# Patient Record
Sex: MALE | Race: WHITE | NOT HISPANIC OR LATINO | Employment: OTHER | URBAN - METROPOLITAN AREA
[De-identification: names, ages, dates, MRNs, and addresses within clinical notes are randomized per-mention and may not be internally consistent; named-entity substitution may affect disease eponyms.]

---

## 2023-08-11 PROBLEM — Z86.79 HISTORY OF INTRACRANIAL ANEURYSM: Status: ACTIVE | Noted: 2023-08-11

## 2023-08-11 PROBLEM — Z98.890 S/P ANEURYSM REPAIR: Status: ACTIVE | Noted: 2023-08-11

## 2023-08-11 PROBLEM — R41.9 DEFICIT IN COMPREHENSION: Status: ACTIVE | Noted: 2023-08-11

## 2023-08-11 PROBLEM — R05.9 COUGH: Status: ACTIVE | Noted: 2023-08-11

## 2023-08-11 PROBLEM — F32.A DEPRESSION: Status: ACTIVE | Noted: 2023-08-11

## 2023-08-11 PROBLEM — G40.909 SEIZURE DISORDER (MULTI): Status: ACTIVE | Noted: 2023-08-11

## 2023-08-11 PROBLEM — I10 HTN (HYPERTENSION), BENIGN: Status: ACTIVE | Noted: 2023-08-11

## 2023-08-11 PROBLEM — G81.91 HEMIPARESIS, RIGHT (MULTI): Status: ACTIVE | Noted: 2023-08-11

## 2023-08-11 PROBLEM — M25.561 RIGHT KNEE PAIN: Status: ACTIVE | Noted: 2023-08-11

## 2023-08-11 PROBLEM — K21.9 GERD (GASTROESOPHAGEAL REFLUX DISEASE): Status: ACTIVE | Noted: 2023-08-11

## 2023-08-11 PROBLEM — I69.020: Status: ACTIVE | Noted: 2023-08-11

## 2023-08-11 PROBLEM — R47.89 SPEECH DYSFLUENCY: Status: ACTIVE | Noted: 2023-08-11

## 2023-08-11 PROBLEM — R10.9 RIGHT FLANK PAIN: Status: ACTIVE | Noted: 2023-08-11

## 2023-08-11 PROBLEM — R31.9 HEMATURIA: Status: ACTIVE | Noted: 2023-08-11

## 2023-08-11 PROBLEM — E66.9 OBESITY (BMI 30-39.9): Status: ACTIVE | Noted: 2023-08-11

## 2023-08-11 PROBLEM — H53.461 HEMIANOPIA, HOMONYMOUS, RIGHT: Status: ACTIVE | Noted: 2023-08-11

## 2023-08-11 PROBLEM — R48.2 APRAXIA: Status: ACTIVE | Noted: 2023-08-11

## 2023-08-11 PROBLEM — Z86.73 HISTORY OF STROKE: Status: ACTIVE | Noted: 2023-08-11

## 2023-08-11 PROBLEM — Z86.79 S/P ANEURYSM REPAIR: Status: ACTIVE | Noted: 2023-08-11

## 2023-08-11 PROBLEM — G40.802: Status: ACTIVE | Noted: 2023-08-11

## 2023-08-11 PROBLEM — N20.1 CALCULUS OF DISTAL RIGHT URETER: Status: ACTIVE | Noted: 2023-08-11

## 2023-08-11 PROBLEM — H52.13 MYOPIA OF BOTH EYES: Status: ACTIVE | Noted: 2023-08-11

## 2023-08-11 PROBLEM — E78.5 HYPERLIPIDEMIA: Status: ACTIVE | Noted: 2023-08-11

## 2023-08-11 RX ORDER — TOPIRAMATE 50 MG/1
1 TABLET, FILM COATED ORAL DAILY
COMMUNITY
End: 2023-10-10 | Stop reason: SDUPTHER

## 2023-08-11 RX ORDER — ACETAMINOPHEN 325 MG/1
TABLET ORAL
COMMUNITY

## 2023-08-11 RX ORDER — LEVETIRACETAM 750 MG/1
1 TABLET ORAL 2 TIMES DAILY
COMMUNITY
End: 2023-08-28

## 2023-08-11 RX ORDER — METOPROLOL TARTRATE 25 MG/1
1 TABLET, FILM COATED ORAL 2 TIMES DAILY
COMMUNITY
Start: 2020-09-18 | End: 2023-08-28

## 2023-08-24 ENCOUNTER — OFFICE VISIT (OUTPATIENT)
Dept: PRIMARY CARE | Facility: CLINIC | Age: 48
End: 2023-08-24

## 2023-08-24 VITALS
RESPIRATION RATE: 18 BRPM | SYSTOLIC BLOOD PRESSURE: 124 MMHG | HEART RATE: 64 BPM | WEIGHT: 220 LBS | DIASTOLIC BLOOD PRESSURE: 76 MMHG | OXYGEN SATURATION: 97 % | HEIGHT: 64 IN | BODY MASS INDEX: 37.56 KG/M2 | TEMPERATURE: 96.8 F

## 2023-08-24 DIAGNOSIS — I69.020 APHASIA DUE TO AND NOT CONCURRENT WITH NONTRAUMATIC SUBARACHNOID HEMORRHAGE: ICD-10-CM

## 2023-08-24 DIAGNOSIS — Z86.79 HISTORY OF INTRACRANIAL ANEURYSM: ICD-10-CM

## 2023-08-24 DIAGNOSIS — Z86.79 S/P ANEURYSM REPAIR: ICD-10-CM

## 2023-08-24 DIAGNOSIS — I10 HTN (HYPERTENSION), BENIGN: ICD-10-CM

## 2023-08-24 DIAGNOSIS — Z00.00 MEDICARE ANNUAL WELLNESS VISIT, SUBSEQUENT: ICD-10-CM

## 2023-08-24 DIAGNOSIS — G81.91 HEMIPARESIS, RIGHT (MULTI): ICD-10-CM

## 2023-08-24 DIAGNOSIS — E78.5 HYPERLIPIDEMIA, UNSPECIFIED HYPERLIPIDEMIA TYPE: ICD-10-CM

## 2023-08-24 DIAGNOSIS — Z98.890 S/P ANEURYSM REPAIR: ICD-10-CM

## 2023-08-24 DIAGNOSIS — G40.909 SEIZURE DISORDER (MULTI): ICD-10-CM

## 2023-08-24 DIAGNOSIS — E66.01 CLASS 2 SEVERE OBESITY WITH SERIOUS COMORBIDITY AND BODY MASS INDEX (BMI) OF 37.0 TO 37.9 IN ADULT, UNSPECIFIED OBESITY TYPE (MULTI): ICD-10-CM

## 2023-08-24 DIAGNOSIS — Z86.73 HISTORY OF STROKE: ICD-10-CM

## 2023-08-24 DIAGNOSIS — Z00.00 ANNUAL PHYSICAL EXAM: Primary | ICD-10-CM

## 2023-08-24 PROCEDURE — 3078F DIAST BP <80 MM HG: CPT | Performed by: INTERNAL MEDICINE

## 2023-08-24 PROCEDURE — 90715 TDAP VACCINE 7 YRS/> IM: CPT | Performed by: INTERNAL MEDICINE

## 2023-08-24 PROCEDURE — 99214 OFFICE O/P EST MOD 30 MIN: CPT | Performed by: INTERNAL MEDICINE

## 2023-08-24 PROCEDURE — 1036F TOBACCO NON-USER: CPT | Performed by: INTERNAL MEDICINE

## 2023-08-24 PROCEDURE — 99396 PREV VISIT EST AGE 40-64: CPT | Performed by: INTERNAL MEDICINE

## 2023-08-24 PROCEDURE — 3008F BODY MASS INDEX DOCD: CPT | Performed by: INTERNAL MEDICINE

## 2023-08-24 PROCEDURE — 3074F SYST BP LT 130 MM HG: CPT | Performed by: INTERNAL MEDICINE

## 2023-08-24 PROCEDURE — 99497 ADVNCD CARE PLAN 30 MIN: CPT | Performed by: INTERNAL MEDICINE

## 2023-08-24 PROCEDURE — 90471 IMMUNIZATION ADMIN: CPT | Performed by: INTERNAL MEDICINE

## 2023-08-24 ASSESSMENT — PAIN SCALES - GENERAL: PAINLEVEL: 0-NO PAIN

## 2023-08-24 ASSESSMENT — PATIENT HEALTH QUESTIONNAIRE - PHQ9
2. FEELING DOWN, DEPRESSED OR HOPELESS: NOT AT ALL
SUM OF ALL RESPONSES TO PHQ9 QUESTIONS 1 AND 2: 0
1. LITTLE INTEREST OR PLEASURE IN DOING THINGS: NOT AT ALL

## 2023-08-24 ASSESSMENT — ACTIVITIES OF DAILY LIVING (ADL)
DRESSING: INDEPENDENT
MANAGING_FINANCES: TOTAL CARE
GROCERY_SHOPPING: NEEDS ASSISTANCE
BATHING: INDEPENDENT
DOING_HOUSEWORK: INDEPENDENT
TAKING_MEDICATION: INDEPENDENT

## 2023-08-24 NOTE — PATIENT INSTRUCTIONS
Plan  TDAP today   Medicare wellness done  Blood tests ordered  Current medications are effective. advised to continue current medications.  Discussed about obesity and complications related to it. Discussed about weight reduction and regular exercise to decrease weight. Questions related to it answered to patient's satisfaction.   F/U 6 months and in 12 months for medicare wellness

## 2023-08-24 NOTE — PROGRESS NOTES
"My nurse note reviewed. Patient is here for:  Medicare Annual Wellness Visit Subsequent     And for follow up  Here with mother in law. As per her he is on medicare currently   Lives alone   Had stroke with aneurysm repair in 2017. Has 2 children, lives with their grandmother   Patient denies any shortness of breath, PND, orthopnea, chest pain , palpitation, syncope or edema in legs  patient denies any abdominal pain, tenderness, nausea, vomiting, change in bowel habits or blood in stool.  Has some trouble focusing eyes at times.   Sees neurologist for his stroke    Reformed smoker    During Medicare wellness apart from looking at assessment done by nurse,  I also asked following :    Alcohol use : Alcohol screening  was  done during this visit. Patient is not having any issue with increase alcohol use . No ETOH abuse was observed by history . Drinks socially . Drinks more pops.    HIV test: patient was not found to be high risk for HIV     Cognitive issue : yes     Advanced Directive: Patient has advanced directive including living will and Health care power of . Health GIORGIO is sister Paulette Stevens . All questions related to it answered. Total time > 16 min.     I have reviewed all active medications patient is currently on . Questions related to medication answered to patient's satisfaction.    Takes meds ok   No seizures over last few yrs   Uses cane for long walking   REVIEW OF SYSTEMS:   All other systems have been reviewed and are negative in relation to patient's complaint and other than what is mentioned in History of present illness.  OBJECTIVE :  /76 (BP Location: Left arm, Patient Position: Sitting, BP Cuff Size: Adult)   Pulse 64   Temp 36 °C (96.8 °F)   Resp 18   Ht 1.626 m (5' 4\")   Wt 99.8 kg (220 lb)   SpO2 97%   BMI 37.76 kg/m²     Vitals noted   Not in acute distress  Conj Pink, No icterus  Neck:No Cervical LN enlargement, No Thyroid enlargement No carotid bruit  Lungs: good air " entry bilaterally, no rales or rhonchi  CVS: S1 S2 + , no S3. No loud heart murmur heard.   Abdomen: Soft, non tender , BS +. no organomegaly , no CVA tenderness  MSK: No spine tenderness or muscle tenderness noted on gross examination  CNS: Pt is alert, moving all 4 extremities. no motor weakness or abnormal movements noted on gross examination except some weakenss in R UL and R LL . Has some trouble with certain words and with memory .   Extremities: No edema, No calf tenderness, Teddy's sign negative.    Assessment:  1. Medicare annual wellness visit, subsequent - done. Tests ordered   2. Aphasia due to and not concurrent with nontraumatic subarachnoid hemorrhage - speaks some words. Carries computer to communicate but not working today    3. History of intracranial aneurysm    4. History of stroke    5. Seizure disorder (CMS/[facility]) - on med. stable   6. HTN (hypertension), benign -controlled     7. S/P aneurysm repair    8. Hyperlipidemia, unspecified hyperlipidemia type - hx of. Blood tests    9. Hemiparesis, right (CMS/[facility])    10. Class 2 severe obesity with serious comorbidity and body mass index (BMI) of 37.0 to 37.9 in adult, unspecified obesity type (CMS/[facility]) -wt reduction        Plan  TDAP today   Medicare wellness done  Blood tests ordered  Current medications are effective. advised to continue current medications.  Discussed about obesity and complications related to it. Discussed about weight reduction and regular exercise to decrease weight. Questions related to it answered to patient's satisfaction.   F/U 6 months and in 12 months for medicare wellness    Medicare wellness was done as family (mother in law ) said he was on medicare but later on as per my  she called from home and said that he had only medicaid and not medicare so code was changed for annual physical and follow up.

## 2023-08-25 DIAGNOSIS — I10 HTN (HYPERTENSION), BENIGN: ICD-10-CM

## 2023-08-25 DIAGNOSIS — G40.909 SEIZURE DISORDER (MULTI): Primary | ICD-10-CM

## 2023-08-28 RX ORDER — LEVETIRACETAM 750 MG/1
750 TABLET ORAL 2 TIMES DAILY
Qty: 60 TABLET | Refills: 10 | Status: SHIPPED | OUTPATIENT
Start: 2023-08-28

## 2023-08-28 RX ORDER — METOPROLOL TARTRATE 25 MG/1
25 TABLET, FILM COATED ORAL 2 TIMES DAILY
Qty: 60 TABLET | Refills: 10 | Status: SHIPPED | OUTPATIENT
Start: 2023-08-28

## 2023-10-10 DIAGNOSIS — G40.802: Primary | ICD-10-CM

## 2023-10-11 RX ORDER — TOPIRAMATE 50 MG/1
50 TABLET, FILM COATED ORAL DAILY
Qty: 90 TABLET | Refills: 3 | Status: SHIPPED | OUTPATIENT
Start: 2023-10-11

## 2024-05-15 ENCOUNTER — TELEPHONE (OUTPATIENT)
Dept: PRIMARY CARE | Facility: CLINIC | Age: 49
End: 2024-05-15
Payer: MEDICAID

## 2024-05-15 NOTE — TELEPHONE ENCOUNTER
----- Message from Jane Gary sent at 5/14/2024  3:30 PM EDT -----  Please call and schedule medicare wellness

## 2024-07-18 DIAGNOSIS — G40.909 SEIZURE DISORDER (MULTI): ICD-10-CM

## 2024-07-18 DIAGNOSIS — I10 HTN (HYPERTENSION), BENIGN: ICD-10-CM

## 2024-07-19 RX ORDER — LEVETIRACETAM 750 MG/1
750 TABLET ORAL 2 TIMES DAILY
Qty: 60 TABLET | Refills: 10 | Status: SHIPPED | OUTPATIENT
Start: 2024-07-19

## 2024-07-19 RX ORDER — METOPROLOL TARTRATE 25 MG/1
25 TABLET, FILM COATED ORAL 2 TIMES DAILY
Qty: 60 TABLET | Refills: 10 | Status: SHIPPED | OUTPATIENT
Start: 2024-07-19

## 2024-09-26 DIAGNOSIS — G40.802: ICD-10-CM

## 2024-09-27 RX ORDER — TOPIRAMATE 50 MG/1
50 TABLET, FILM COATED ORAL DAILY
Qty: 30 TABLET | Refills: 10 | Status: SHIPPED | OUTPATIENT
Start: 2024-09-27

## 2024-11-05 ENCOUNTER — APPOINTMENT (OUTPATIENT)
Dept: PRIMARY CARE | Facility: CLINIC | Age: 49
End: 2024-11-05
Payer: MEDICARE

## 2024-11-05 ENCOUNTER — LAB (OUTPATIENT)
Dept: LAB | Facility: LAB | Age: 49
End: 2024-11-05
Payer: MEDICARE

## 2024-11-05 VITALS
TEMPERATURE: 97.3 F | DIASTOLIC BLOOD PRESSURE: 94 MMHG | HEART RATE: 94 BPM | OXYGEN SATURATION: 98 % | SYSTOLIC BLOOD PRESSURE: 142 MMHG

## 2024-11-05 DIAGNOSIS — E66.01 CLASS 2 SEVERE OBESITY WITH SERIOUS COMORBIDITY AND BODY MASS INDEX (BMI) OF 37.0 TO 37.9 IN ADULT, UNSPECIFIED OBESITY TYPE: ICD-10-CM

## 2024-11-05 DIAGNOSIS — I69.020 APHASIA DUE TO AND NOT CONCURRENT WITH NONTRAUMATIC SUBARACHNOID HEMORRHAGE: ICD-10-CM

## 2024-11-05 DIAGNOSIS — R73.9 HYPERGLYCEMIA: ICD-10-CM

## 2024-11-05 DIAGNOSIS — Z83.3 FAMILY HISTORY OF DIABETES MELLITUS: ICD-10-CM

## 2024-11-05 DIAGNOSIS — Z13.31 DEPRESSION SCREEN: ICD-10-CM

## 2024-11-05 DIAGNOSIS — E78.5 HYPERLIPIDEMIA, UNSPECIFIED HYPERLIPIDEMIA TYPE: ICD-10-CM

## 2024-11-05 DIAGNOSIS — Z12.5 SCREENING PSA (PROSTATE SPECIFIC ANTIGEN): ICD-10-CM

## 2024-11-05 DIAGNOSIS — E66.9 OBESITY (BMI 30-39.9): ICD-10-CM

## 2024-11-05 DIAGNOSIS — K92.1 BLOOD IN STOOL: ICD-10-CM

## 2024-11-05 DIAGNOSIS — E66.812 CLASS 2 SEVERE OBESITY WITH SERIOUS COMORBIDITY AND BODY MASS INDEX (BMI) OF 37.0 TO 37.9 IN ADULT, UNSPECIFIED OBESITY TYPE: ICD-10-CM

## 2024-11-05 DIAGNOSIS — I10 HTN (HYPERTENSION), BENIGN: ICD-10-CM

## 2024-11-05 DIAGNOSIS — G81.91 HEMIPARESIS, RIGHT (MULTI): ICD-10-CM

## 2024-11-05 DIAGNOSIS — Z86.79 HISTORY OF INTRACRANIAL ANEURYSM: ICD-10-CM

## 2024-11-05 DIAGNOSIS — Z71.89 ADVANCE DIRECTIVE DISCUSSED WITH PATIENT: ICD-10-CM

## 2024-11-05 DIAGNOSIS — Z00.00 ANNUAL PHYSICAL EXAM: ICD-10-CM

## 2024-11-05 DIAGNOSIS — Z00.00 MEDICARE ANNUAL WELLNESS VISIT, SUBSEQUENT: Primary | ICD-10-CM

## 2024-11-05 DIAGNOSIS — Z86.73 HISTORY OF STROKE: ICD-10-CM

## 2024-11-05 DIAGNOSIS — G40.909 SEIZURE DISORDER (MULTI): ICD-10-CM

## 2024-11-05 DIAGNOSIS — Z13.39 ALCOHOL SCREENING: ICD-10-CM

## 2024-11-05 DIAGNOSIS — Z86.79 S/P ANEURYSM REPAIR: ICD-10-CM

## 2024-11-05 DIAGNOSIS — Z98.890 S/P ANEURYSM REPAIR: ICD-10-CM

## 2024-11-05 LAB
ALBUMIN SERPL BCP-MCNC: 4.7 G/DL (ref 3.4–5)
ALP SERPL-CCNC: 71 U/L (ref 33–120)
ALT SERPL W P-5'-P-CCNC: 27 U/L (ref 10–52)
ANION GAP SERPL CALC-SCNC: 14 MMOL/L (ref 10–20)
AST SERPL W P-5'-P-CCNC: 20 U/L (ref 9–39)
BASOPHILS # BLD AUTO: 0.05 X10*3/UL (ref 0–0.1)
BASOPHILS NFR BLD AUTO: 0.5 %
BILIRUB DIRECT SERPL-MCNC: 0 MG/DL (ref 0–0.3)
BILIRUB SERPL-MCNC: 0.3 MG/DL (ref 0–1.2)
BUN SERPL-MCNC: 11 MG/DL (ref 6–23)
CALCIUM SERPL-MCNC: 9.6 MG/DL (ref 8.6–10.3)
CHLORIDE SERPL-SCNC: 108 MMOL/L (ref 98–107)
CHOLEST SERPL-MCNC: 233 MG/DL (ref 0–199)
CHOLESTEROL/HDL RATIO: 6.6
CO2 SERPL-SCNC: 23 MMOL/L (ref 21–32)
CREAT SERPL-MCNC: 1.29 MG/DL (ref 0.5–1.3)
EGFRCR SERPLBLD CKD-EPI 2021: 68 ML/MIN/1.73M*2
EOSINOPHIL # BLD AUTO: 0.26 X10*3/UL (ref 0–0.7)
EOSINOPHIL NFR BLD AUTO: 2.4 %
ERYTHROCYTE [DISTWIDTH] IN BLOOD BY AUTOMATED COUNT: 13 % (ref 11.5–14.5)
GLUCOSE SERPL-MCNC: 84 MG/DL (ref 74–99)
HCT VFR BLD AUTO: 49.7 % (ref 41–52)
HDLC SERPL-MCNC: 35.3 MG/DL
HGB BLD-MCNC: 16.3 G/DL (ref 13.5–17.5)
IMM GRANULOCYTES # BLD AUTO: 0.04 X10*3/UL (ref 0–0.7)
IMM GRANULOCYTES NFR BLD AUTO: 0.4 % (ref 0–0.9)
LYMPHOCYTES # BLD AUTO: 2.72 X10*3/UL (ref 1.2–4.8)
LYMPHOCYTES NFR BLD AUTO: 25 %
MCH RBC QN AUTO: 29.3 PG (ref 26–34)
MCHC RBC AUTO-ENTMCNC: 32.8 G/DL (ref 32–36)
MCV RBC AUTO: 89 FL (ref 80–100)
MONOCYTES # BLD AUTO: 0.63 X10*3/UL (ref 0.1–1)
MONOCYTES NFR BLD AUTO: 5.8 %
NEUTROPHILS # BLD AUTO: 7.19 X10*3/UL (ref 1.2–7.7)
NEUTROPHILS NFR BLD AUTO: 65.9 %
NON-HDL CHOLESTEROL: 198 MG/DL (ref 0–149)
NRBC BLD-RTO: 0 /100 WBCS (ref 0–0)
PLATELET # BLD AUTO: 261 X10*3/UL (ref 150–450)
POTASSIUM SERPL-SCNC: 3.9 MMOL/L (ref 3.5–5.3)
PROT SERPL-MCNC: 7.6 G/DL (ref 6.4–8.2)
RBC # BLD AUTO: 5.57 X10*6/UL (ref 4.5–5.9)
SODIUM SERPL-SCNC: 141 MMOL/L (ref 136–145)
TSH SERPL-ACNC: 1.35 MIU/L (ref 0.44–3.98)
WBC # BLD AUTO: 10.9 X10*3/UL (ref 4.4–11.3)

## 2024-11-05 PROCEDURE — G0442 ANNUAL ALCOHOL SCREEN 15 MIN: HCPCS | Performed by: INTERNAL MEDICINE

## 2024-11-05 PROCEDURE — 83718 ASSAY OF LIPOPROTEIN: CPT

## 2024-11-05 PROCEDURE — G0439 PPPS, SUBSEQ VISIT: HCPCS | Performed by: INTERNAL MEDICINE

## 2024-11-05 PROCEDURE — 99214 OFFICE O/P EST MOD 30 MIN: CPT | Performed by: INTERNAL MEDICINE

## 2024-11-05 PROCEDURE — 3080F DIAST BP >= 90 MM HG: CPT | Performed by: INTERNAL MEDICINE

## 2024-11-05 PROCEDURE — 84443 ASSAY THYROID STIM HORMONE: CPT

## 2024-11-05 PROCEDURE — 1036F TOBACCO NON-USER: CPT | Performed by: INTERNAL MEDICINE

## 2024-11-05 PROCEDURE — G0103 PSA SCREENING: HCPCS

## 2024-11-05 PROCEDURE — 99396 PREV VISIT EST AGE 40-64: CPT | Performed by: INTERNAL MEDICINE

## 2024-11-05 PROCEDURE — 82465 ASSAY BLD/SERUM CHOLESTEROL: CPT

## 2024-11-05 PROCEDURE — 99497 ADVNCD CARE PLAN 30 MIN: CPT | Performed by: INTERNAL MEDICINE

## 2024-11-05 PROCEDURE — 36415 COLL VENOUS BLD VENIPUNCTURE: CPT

## 2024-11-05 PROCEDURE — 80053 COMPREHEN METABOLIC PANEL: CPT

## 2024-11-05 PROCEDURE — 85025 COMPLETE CBC W/AUTO DIFF WBC: CPT

## 2024-11-05 PROCEDURE — G0444 DEPRESSION SCREEN ANNUAL: HCPCS | Performed by: INTERNAL MEDICINE

## 2024-11-05 PROCEDURE — 3077F SYST BP >= 140 MM HG: CPT | Performed by: INTERNAL MEDICINE

## 2024-11-05 PROCEDURE — 83036 HEMOGLOBIN GLYCOSYLATED A1C: CPT

## 2024-11-05 ASSESSMENT — PATIENT HEALTH QUESTIONNAIRE - PHQ9
SUM OF ALL RESPONSES TO PHQ9 QUESTIONS 1 AND 2: 0
1. LITTLE INTEREST OR PLEASURE IN DOING THINGS: NOT AT ALL
2. FEELING DOWN, DEPRESSED OR HOPELESS: NOT AT ALL

## 2024-11-05 ASSESSMENT — ACTIVITIES OF DAILY LIVING (ADL)
TAKING_MEDICATION: INDEPENDENT
BATHING: INDEPENDENT
DRESSING: INDEPENDENT
MANAGING_FINANCES: INDEPENDENT
DOING_HOUSEWORK: INDEPENDENT
GROCERY_SHOPPING: INDEPENDENT

## 2024-11-05 NOTE — PATIENT INSTRUCTIONS
Plan  Medicare wellness done  Annual physical done. Tests ordered  Blood tests  Declines flu shot   Colonoscopy ordered  Discussed about obesity and complications related to it. Discussed about weight reduction and regular exercise to decrease weight. Questions related to it answered to patient's satisfaction.  Advised to reduce salt intake in diet  F/U bp in 3-4 weeks for f/U on blood pressure, blood tests etc.

## 2024-11-05 NOTE — PROGRESS NOTES
Pt is here for Medicare wellness , physical and follow up       Patient denies any shortness of breath, PND, orthopnea, chest pain , palpitation, syncope or edema in legs  patient denies any abdominal pain, tenderness, nausea, vomiting,  Occ ges upset in stomach . Occ hemorrhoid bleeds. Going on for few days .   Patient denies any weakness in extremities.. Denies any headache, visual symptoms , speech problems or  tremors . No TIA or stroke like symptoms..  Hx of CVA in 2017.   Taking meds ok   Lives alone  in apartment . Has med alert button . No trouble swallowing . .   Here with care manager   Pt has aphasia and uses I pad a lot .     During Medicare wellness apart from looking at assessment done by nurse,  I also asked following :    Alcohol use : Alcohol screening  was  done during this visit. Patient is not having any issue with increase alcohol use . No ETOH abuse was observed by history . No drinking at all.     Non smoker     Depression screen:  > 5 minutes  were spent screening for depression using PHQ2/PHQ9 as documented in the chart.    HIV test: patient was not found to be high risk for HIV     Cognitive issue : None     Advanced Directive: Patient has advanced directive including living will and Health care power of . Health POA is Paulette.  All questions related to it answered. Total time > 16 min. He doesnot have living will currently . He wants cpr but doesnot want intubation .     I have reviewed all active medications patient is currently on . Questions related to medication answered to patient's satisfaction.    REVIEW OF SYSTEMS:   All other systems have been reviewed and are negative in relation to patient's complaint and other than what is mentioned in History of present illness.    Over the past 2 weeks, how often have you been bothered by any of the following problems?  Little interest or pleasure in doing things: Not at all  Feeling down, depressed, or hopeless: Not at all  Nutrition  and Exercise  Current Diet: Well Balanced Diet, Unhealthy Diet  Adequate Fluid Intake: Yes  Caffeine: No  Exercise Frequency: Infrequently  IADL's  Grocery Shopping: Independent  Doing Housework: Independent  Taking Medication: Independent  Managing Finances: Independent  [place] Home Safety Risk Factors  Home Safety Risk Factors: None     Not doing much exercise  OBJECTIVE :    BP (!) 142/94   Pulse 94   Temp 36.3 °C (97.3 °F)   SpO2 98%   Vitals noted  Not in acute distress  Conj Pink, No icterus  ears exam normal  Neck:No Cervical LN enlargement, No Thyroid enlargement No carotid bruit  Lungs: good air entry bilaterally, no rales or rhonchi  CVS: S1 S2 + , no S3. No loud heart murmur heard.   Abdomen: Soft, non tender , BS +. no organomegaly , no CVA tenderness  MSK: No spine tenderness or muscle tenderness noted on gross examination  CNS: Pt is alert, not oriented well. He has trouble finding right words, uses his ipad machine to find some words. moving all 4 extremities. motor weakness noted on R side 4/5 . No abnormal movements noted on gross examination.  Extremities: No edema, No calf tenderness, Teddy's sign negative. DTR + knee and wrists, Rhomberg's neg      Assessment:  1. Medicare annual wellness visit, subsequent  Done.      2. Annual physical exam  Done. Tests ordered      3. Alcohol screening        4. Depression screen        5. Advance directive discussed with patient                7. History of intracranial aneurysm        8. History of stroke  In 2017       9. Aphasia due to and not concurrent with nontraumatic subarachnoid hemorrhage  Due to stroke.       10. Seizure disorder (Multi)  Hx of. Not lately       11. HTN (hypertension), benign  uncontrolled        12. S/P aneurysm repair        13. Hyperlipidemia, unspecified hyperlipidemia type  Colonoscopy Screening; Average Risk Patient    Basic Metabolic Panel    CBC and Auto Differential    Lipid Panel Non-Fasting    Hepatic Function Panel     Prostate Specific Antigen    Thyroid Stimulating Hormone    Hemoglobin A1C      14. Hemiparesis, right (Multi)  R sided , due to stroke      15. Family history of diabetes mellitus  Hemoglobin A1C      16. Obesity (BMI 30-39.9)  Hemoglobin A1C      17. Blood in stool  Colonoscopy       18. Screening PSA (prostate specific antigen)  Prostate Specific Antigen      19. Hyperglycemia  Hemoglobin A1C        Plan  Medicare wellness done  Annual physical done. Tests ordered  Blood tests  Declines flu shot   Colonoscopy ordered  Discussed about obesity and complications related to it. Discussed about weight reduction and regular exercise to decrease weight. Questions related to it answered to patient's satisfaction.  Advised to reduce salt intake in diet  Sister wanted his [place] appointment form filled . Will review it and fill it out.   F/U bp in 3-4 weeks for f/U on blood pressure, blood tests etc.

## 2024-11-06 LAB
EST. AVERAGE GLUCOSE BLD GHB EST-MCNC: 103 MG/DL
HBA1C MFR BLD: 5.2 %
PSA SERPL-MCNC: 0.72 NG/ML

## 2024-12-03 ENCOUNTER — APPOINTMENT (OUTPATIENT)
Dept: PRIMARY CARE | Facility: CLINIC | Age: 49
End: 2024-12-03
Payer: MEDICARE

## 2024-12-03 VITALS
BODY MASS INDEX: 39.99 KG/M2 | TEMPERATURE: 96.3 F | WEIGHT: 233 LBS | OXYGEN SATURATION: 98 % | DIASTOLIC BLOOD PRESSURE: 86 MMHG | SYSTOLIC BLOOD PRESSURE: 128 MMHG | HEART RATE: 72 BPM

## 2024-12-03 DIAGNOSIS — I10 HTN (HYPERTENSION), BENIGN: Primary | ICD-10-CM

## 2024-12-03 DIAGNOSIS — Z86.73 HISTORY OF STROKE: ICD-10-CM

## 2024-12-03 PROCEDURE — 3079F DIAST BP 80-89 MM HG: CPT | Performed by: INTERNAL MEDICINE

## 2024-12-03 PROCEDURE — 99213 OFFICE O/P EST LOW 20 MIN: CPT | Performed by: INTERNAL MEDICINE

## 2024-12-03 PROCEDURE — 3074F SYST BP LT 130 MM HG: CPT | Performed by: INTERNAL MEDICINE

## 2024-12-03 PROCEDURE — 1036F TOBACCO NON-USER: CPT | Performed by: INTERNAL MEDICINE

## 2024-12-03 NOTE — PATIENT INSTRUCTIONS
Plan  Declines flu shot   Current medications are effective. advised to continue current medications.  F/U May or as needed

## 2024-12-03 NOTE — PROGRESS NOTES
Here for HTN f/U     Taking meds ok   Eats lot of salty food outside.   Lives alone. Here with mother in law.   Sister lives close by   Patient denies any shortness of breath, PND, orthopnea, chest pain , palpitation, syncope or edema in legs    OBJECTIVE :  /86   Pulse 72   Temp 35.7 °C (96.3 °F)   Wt 106 kg (233 lb)   SpO2 98%   BMI 39.99 kg/m²   CVS: S1 S2 + , no S3. No loud heart murmur appreciated. Lungs clear, No edema  Alert, hsa memory issue, uses I pad cyrus for some communications.     Assessment:  1. HTN (hypertension), benign - controlled  With med. Continue it   2. History of stroke -2017     Plan  Declines flu shot   Current medications are effective. advised to continue current medications.  F/U May or as needed

## 2025-01-28 ENCOUNTER — TELEPHONE (OUTPATIENT)
Dept: GASTROENTEROLOGY | Facility: CLINIC | Age: 50
End: 2025-01-28
Payer: MEDICARE

## 2025-01-28 DIAGNOSIS — Z12.11 COLON CANCER SCREENING: Primary | ICD-10-CM

## 2025-01-29 RX ORDER — SODIUM, POTASSIUM,MAG SULFATES 17.5-3.13G
SOLUTION, RECONSTITUTED, ORAL ORAL
Qty: 354 ML | Refills: 0 | Status: SHIPPED | OUTPATIENT
Start: 2025-01-29

## 2025-02-04 ENCOUNTER — HOSPITAL ENCOUNTER (OUTPATIENT)
Dept: GASTROENTEROLOGY | Facility: HOSPITAL | Age: 50
Discharge: HOME | End: 2025-02-04
Payer: MEDICARE

## 2025-02-04 ENCOUNTER — ANESTHESIA EVENT (OUTPATIENT)
Dept: GASTROENTEROLOGY | Facility: HOSPITAL | Age: 50
End: 2025-02-04
Payer: MEDICARE

## 2025-02-04 ENCOUNTER — ANESTHESIA (OUTPATIENT)
Dept: GASTROENTEROLOGY | Facility: HOSPITAL | Age: 50
End: 2025-02-04
Payer: MEDICARE

## 2025-02-04 VITALS
DIASTOLIC BLOOD PRESSURE: 83 MMHG | OXYGEN SATURATION: 97 % | HEIGHT: 66 IN | WEIGHT: 233.69 LBS | BODY MASS INDEX: 37.56 KG/M2 | SYSTOLIC BLOOD PRESSURE: 153 MMHG | HEART RATE: 74 BPM | RESPIRATION RATE: 18 BRPM | TEMPERATURE: 96.8 F

## 2025-02-04 DIAGNOSIS — E78.5 HYPERLIPIDEMIA, UNSPECIFIED HYPERLIPIDEMIA TYPE: ICD-10-CM

## 2025-02-04 PROCEDURE — A45385 PR COLONOSCOPY,REMV LESN,SNARE: Performed by: NURSE ANESTHETIST, CERTIFIED REGISTERED

## 2025-02-04 PROCEDURE — 45385 COLONOSCOPY W/LESION REMOVAL: CPT | Performed by: INTERNAL MEDICINE

## 2025-02-04 PROCEDURE — 7100000009 HC PHASE TWO TIME - INITIAL BASE CHARGE

## 2025-02-04 PROCEDURE — 2500000004 HC RX 250 GENERAL PHARMACY W/ HCPCS (ALT 636 FOR OP/ED): Performed by: NURSE ANESTHETIST, CERTIFIED REGISTERED

## 2025-02-04 PROCEDURE — 7100000010 HC PHASE TWO TIME - EACH INCREMENTAL 1 MINUTE

## 2025-02-04 PROCEDURE — 45380 COLONOSCOPY AND BIOPSY: CPT | Performed by: INTERNAL MEDICINE

## 2025-02-04 PROCEDURE — A45385 PR COLONOSCOPY,REMV LESN,SNARE: Performed by: ANESTHESIOLOGY

## 2025-02-04 PROCEDURE — 3700000001 HC GENERAL ANESTHESIA TIME - INITIAL BASE CHARGE

## 2025-02-04 PROCEDURE — 3700000002 HC GENERAL ANESTHESIA TIME - EACH INCREMENTAL 1 MINUTE

## 2025-02-04 RX ORDER — SODIUM CHLORIDE 0.9 % (FLUSH) 0.9 %
SYRINGE (ML) INJECTION AS NEEDED
Status: DISCONTINUED | OUTPATIENT
Start: 2025-02-04 | End: 2025-02-04

## 2025-02-04 RX ORDER — PROPOFOL 10 MG/ML
INJECTION, EMULSION INTRAVENOUS AS NEEDED
Status: DISCONTINUED | OUTPATIENT
Start: 2025-02-04 | End: 2025-02-04

## 2025-02-04 RX ORDER — LIDOCAINE HCL/PF 100 MG/5ML
SYRINGE (ML) INTRAVENOUS AS NEEDED
Status: DISCONTINUED | OUTPATIENT
Start: 2025-02-04 | End: 2025-02-04

## 2025-02-04 RX ADMIN — Medication 10 ML: at 12:19

## 2025-02-04 RX ADMIN — PROPOFOL 100 MCG/KG/MIN: 10 INJECTION, EMULSION INTRAVENOUS at 11:58

## 2025-02-04 RX ADMIN — LIDOCAINE HYDROCHLORIDE 60 MG: 20 INJECTION, SOLUTION INTRAVENOUS at 11:57

## 2025-02-04 RX ADMIN — PROPOFOL 100 MG: 10 INJECTION, EMULSION INTRAVENOUS at 11:57

## 2025-02-04 SDOH — HEALTH STABILITY: MENTAL HEALTH: CURRENT SMOKER: 0

## 2025-02-04 ASSESSMENT — PAIN SCALES - GENERAL
PAINLEVEL_OUTOF10: 0 - NO PAIN
PAINLEVEL_OUTOF10: 0 - NO PAIN
PAIN_LEVEL: 0
PAINLEVEL_OUTOF10: 0 - NO PAIN

## 2025-02-04 ASSESSMENT — PAIN - FUNCTIONAL ASSESSMENT: PAIN_FUNCTIONAL_ASSESSMENT: 0-10

## 2025-02-04 NOTE — ANESTHESIA POSTPROCEDURE EVALUATION
Patient: Raymundo Stevens    Procedure Summary       Date: 02/04/25 Room / Location: [facility]    Anesthesia Start: 1155 Anesthesia Stop:     Procedure: COLONOSCOPY Diagnosis: Hyperlipidemia, unspecified hyperlipidemia type    Scheduled Providers: River Arana MD Responsible Provider: Chicho Aguirre MD    Anesthesia Type: MAC ASA Status: 3            Anesthesia Type: MAC    Vitals Value Taken Time   /84 02/04/25 1225   Temp 36 02/04/25 1225   Pulse 82 02/04/25 1225   Resp 18 02/04/25 1225   SpO2 95 02/04/25 1225       Anesthesia Post Evaluation    Patient location during evaluation: PACU  Patient participation: complete - patient participated  Level of consciousness: sleepy but conscious  Pain score: 0  Pain management: adequate  Airway patency: patent  Cardiovascular status: acceptable and hemodynamically stable  Respiratory status: acceptable and room air  Hydration status: acceptable  Postoperative Nausea and Vomiting: none        There were no known notable events for this encounter.

## 2025-02-04 NOTE — H&P
Outpatient Hospital Procedure    Patient Profile-Procedures  Initial Info  Patient Demographics  Name Raymundo Stevens  Date of Birth 1975  MRN [id number]  Address   [address]   [place] [id number] [address]   [place] [address]    Primary Phone Number [phone]  Secondary Phone Number    Earle Trimble    Procedures   Colonoscopy      Indication:  Crc SCREEN    Primary contact name and number   Extended Emergency Contact Information  Primary Emergency Contact: Jeanne Mcarthur  Home Phone: [phone]  Mobile Phone: [phone]  Relation: Relative   needed? No    General Health  Weight   Vitals:    02/04/25 1107   Weight: 106 kg (233 lb 11 oz)     BMI Body mass index is 37.72 kg/m².    Allergies  Allergies   Allergen Reactions    Egg Unknown       Past Medical History   Past Medical History:   Diagnosis Date    Hemiplegia, unspecified affecting right dominant side (Multi) 07/24/2019    Hemiparesis, right    Hemiplegia, unspecified affecting right dominant side (Multi) 04/16/2019    Hemiparesis, right    Personal history of other diseases of the circulatory system 07/24/2019    History of intracranial aneurysm    Personal history of other diseases of the digestive system 07/22/2020    History of gastroesophageal reflux (GERD)    Personal history of other diseases of the nervous system and sense organs 04/16/2019    History of seizure disorder    Personal history of other mental and behavioral disorders     History of adjustment disorder       Provider assessment  Diagnosis  Medication Reviewed - yes  Prior to Admission medications    Medication Sig Start Date End Date Taking? Authorizing Provider   levETIRAcetam (Keppra) 750 mg tablet TAKE ONE (1) TABLET BY MOUTH TWICE DAILY 7/19/24  Yes Earle Andres MD   metoprolol tartrate (Lopressor) 25 mg tablet TAKE ONE (1) TABLET BY MOUTH TWICE DAILY 7/19/24  Yes Earle Andres MD   acetaminophen (Tylenol)  325 mg tablet Take by mouth.    Historical Provider, MD   topiramate (Topamax) 50 mg tablet TAKE 1 TABLET BY MOUTH ONCE DAILY 9/27/24   Earle Andres MD   sodium,potassium,mag sulfates (Suprep) 17.5-3.13-1.6 gram solution Take one bottle twice as directed by the prep instructions  Patient not taking: Reported on 2/4/2025 1/29/25 2/4/25  River Arana MD       This is my H&P    Physical Exam  Physical Exam  Constitutional:       Appearance: Normal appearance.   HENT:      Head: Normocephalic.      Mouth/Throat:      Mouth: Mucous membranes are moist.   Eyes:      Extraocular Movements: Extraocular movements intact.      Pupils: Pupils are equal, round, and reactive to light.   Cardiovascular:      Rate and Rhythm: Normal rate and regular rhythm.      Pulses: Normal pulses.      Heart sounds: Normal heart sounds.   Pulmonary:      Effort: Pulmonary effort is normal.      Breath sounds: Normal breath sounds.   Abdominal:      General: Bowel sounds are normal.      Palpations: Abdomen is soft.   Neurological:      General: No focal deficit present.      Mental Status: He is alert.         ASA PS Classification 3  Sedation Plan Moderate  Procedure Plan - pre-procedural (re)assesment completed by physician:  discharge/transfer patient when discharge criteria met    River Arana MD  2/4/2025 11:49 AM

## 2025-02-04 NOTE — ANESTHESIA PREPROCEDURE EVALUATION
Patient: Raymundo Stevens    Procedure Information       Date/Time: 02/04/25 1130    Scheduled providers: River Arana MD    Procedure: COLONOSCOPY    Location: [facility]            Relevant Problems   Cardiac   (+) HTN (hypertension), benign   (+) Hyperlipidemia      Neuro   (+) Aphasia with epilepsy (Multi)   (+) Depression   (+) Seizure disorder (Multi)      GI   (+) GERD (gastroesophageal reflux disease)      Endocrine   (+) Class 2 severe obesity with serious comorbidity and body mass index (BMI) of 37.0 to 37.9 in adult, unspecified obesity type       Clinical information reviewed:    Allergies  Meds  Problems              NPO Detail:  NPO/Void Status  Carbohydrate Drink Given Prior to Surgery? : N  Date of Last Liquid: 02/04/25  Time of Last Liquid: 0800  Date of Last Solid: 02/03/25  Time of Last Solid: 0700  Last Intake Type: Clear fluids         Physical Exam    Airway  Mallampati: IV  TM distance: <3 FB  Neck ROM: full     Cardiovascular - normal exam  Rhythm: regular  Rate: normal     Dental - normal exam     Pulmonary - normal exam     Abdominal   (+) obese             Anesthesia Plan    History of general anesthesia?: yes  History of complications of general anesthesia?: no    ASA 3     MAC     The patient is not a current smoker.  Education provided regarding risk of obstructive sleep apnea.  intravenous induction   Anesthetic plan and risks discussed with patient.    Plan discussed with CRNA.

## 2025-02-12 LAB
LAB AP ASR DISCLAIMER: NORMAL
LABORATORY COMMENT REPORT: NORMAL
PATH REPORT.FINAL DX SPEC: NORMAL
PATH REPORT.GROSS SPEC: NORMAL
PATH REPORT.RELEVANT HX SPEC: NORMAL
PATH REPORT.TOTAL CANCER: NORMAL

## 2025-02-14 ENCOUNTER — TELEPHONE (OUTPATIENT)
Dept: GASTROENTEROLOGY | Facility: CLINIC | Age: 50
End: 2025-02-14
Payer: MEDICARE

## 2025-02-14 NOTE — TELEPHONE ENCOUNTER
Attempted to reach pt sister to verify the necessity of an upcoming office visit with Dr Arana. NATALEE

## 2025-02-14 NOTE — TELEPHONE ENCOUNTER
Patient's sister called and stated that she received a message regarding her brother and was returning a phone call. Please review. Thank you.

## 2025-02-20 ENCOUNTER — APPOINTMENT (OUTPATIENT)
Dept: GASTROENTEROLOGY | Facility: CLINIC | Age: 50
End: 2025-02-20
Payer: MEDICARE

## 2025-02-20 VITALS
SYSTOLIC BLOOD PRESSURE: 135 MMHG | HEIGHT: 66 IN | HEART RATE: 88 BPM | DIASTOLIC BLOOD PRESSURE: 92 MMHG | WEIGHT: 238 LBS | BODY MASS INDEX: 38.25 KG/M2

## 2025-02-20 DIAGNOSIS — K64.9 HEMORRHOIDS, UNSPECIFIED HEMORRHOID TYPE: ICD-10-CM

## 2025-02-20 DIAGNOSIS — K51.90 ULCERATIVE COLITIS WITHOUT COMPLICATIONS, UNSPECIFIED LOCATION (MULTI): Primary | ICD-10-CM

## 2025-02-20 DIAGNOSIS — K21.9 GASTROESOPHAGEAL REFLUX DISEASE, UNSPECIFIED WHETHER ESOPHAGITIS PRESENT: ICD-10-CM

## 2025-02-20 PROCEDURE — 1036F TOBACCO NON-USER: CPT | Performed by: INTERNAL MEDICINE

## 2025-02-20 PROCEDURE — 99214 OFFICE O/P EST MOD 30 MIN: CPT | Performed by: INTERNAL MEDICINE

## 2025-02-20 PROCEDURE — 3008F BODY MASS INDEX DOCD: CPT | Performed by: INTERNAL MEDICINE

## 2025-02-20 PROCEDURE — 3080F DIAST BP >= 90 MM HG: CPT | Performed by: INTERNAL MEDICINE

## 2025-02-20 PROCEDURE — 3075F SYST BP GE 130 - 139MM HG: CPT | Performed by: INTERNAL MEDICINE

## 2025-02-20 RX ORDER — OMEPRAZOLE 40 MG/1
40 CAPSULE, DELAYED RELEASE ORAL DAILY
Qty: 30 CAPSULE | Refills: 11 | Status: SHIPPED | OUTPATIENT
Start: 2025-02-20 | End: 2026-02-20

## 2025-02-20 RX ORDER — MESALAMINE 1.2 G/1
1200 TABLET, DELAYED RELEASE ORAL 2 TIMES DAILY
Qty: 60 TABLET | Refills: 11 | Status: SHIPPED | OUTPATIENT
Start: 2025-02-20 | End: 2026-02-20

## 2025-02-20 NOTE — PROGRESS NOTES
Department of Gastroenterology & Hepatology  Initial Consult Note  Date of Service:  February 20, 2025         Patient: Raymundo Stevens    Medical Record: [id number]  Reason for Admission / Consultation: inflammation of colon   Gastroenterology Attending: River Arana MD  Referring Provider: Earle Andres MD No referring provider defined for this encounter.         My final recommendations will be communicated back to the requesting physician by way of shared medical record or letter via US mail         Impression: 49-year-old male with past medical history of CVA with right hemiplegia from brain aneurysm, hypertension here for abnormal colonoscopy    Ulcerative colitis, sigmoid only   Patient having 4-5 soft to loose bowel movement per day without any blood  Sigmoid inflammation seen on colonoscopy with biopsies suggestive of ulcerative colitis  At this time I will start him on Lialda twice daily and check calprotectin level  Will repeat colonoscopy in 1 to 2 years depending on his symptoms    He also has acid reflux and with mild tenderness in epigastric region and concerned about mild gastritis as well especially with his excessive use of sodas  I will start him on omeprazole 40 mg daily and if no improvement in symptoms we will schedule for an EGD  Counseled about cutting down his carbonated beverage use     Hemorrhoids  Has both external and internal hemorrhoids  We will refer him to surgery for possible banding    River Arana MD  Gastroenterology, Hepatology and Nutrition  Advanced Endoscopy  =========================================================================  History of Present Illness:     Raymundo Stveens  is a 49 y.o.   has a past medical history of Hemiplegia, unspecified affecting right dominant side (Multi) (07/24/2019), Hemiplegia, unspecified affecting right dominant side (Multi) (04/16/2019), Personal history of other diseases of the circulatory system (07/24/2019), Personal history of other  diseases of the digestive system (07/22/2020), Personal history of other diseases of the nervous system and sense organs (04/16/2019), and Personal history of other mental and behavioral disorders. who presents for inflammation in colon on colonoscopy.  Patient recently had had a screening colonoscopy done on February 4, 2025 and was found to have inflamed mucosa in the sigmoid colon from 30 to 35 cm and biopsies suggestive of chronic inflammatory disease likely ulcerative colitis.  Patient states that he is having 4-5 soft loose bowel movement every day without any blood.  Denies any weight loss, joint pain or rash.  Denies any nausea, vomiting but does complain of upper mid abdominal discomfort and acid reflux.  He drinks 4-5 sodas per day.  No over-the-counter NSAID use.  No alcohol use.     Also complains of hemorrhoids bothering him with off-and-on bleeding from them on the toilet paper.       Mother had ulcerative colitis      Colonoscopy 2/4/2025  Fair bowel preparation  Subcentimeter polyp in the rectum; performed cold snare; electronic chromoendoscopy was used  Erythematous, ulcerated mucosa in the sigmoid colon (30-35 cm); performed cold forceps biopsy to rule out colitis  Diverticulosis in the sigmoid colon  The ileocecal valve, appendiceal orifice, cecum, ascending colon, hepatic flexure, transverse colon, splenic flexure, descending colon and rectosigmoid appeared normal.  Medium hemorrhoids  . COLON, SIGMOID, BIOPSY:   -- Colonic mucosa with focal cryptitis, crypt abscesses, increased lymphoplasmacytic infiltrate, lymphoid aggregates, and focal mild glandular architectural distortion.  -- AFB and GMS stains are negative for microorganisms (valid controls).  -- See note.      Note:  The specimen demonstrates focal cryptitis, crypt abscesses, increased lymphoplasmacytic infiltrate, lymphoid aggregates, and focal mild glandular architectural distortion.  Additionally, an area of crypt injury with an  associated cluster of histiocytes and a multinucleated giant cell are identified.  The morphologic findings are consistent with a chronic active inflammatory process.  The differential includes a non-specific chronic active inflammatory process, diverticular associated colitis, infection, and inflammatory bowel disease.  Further clinical and endoscopic correlation is recommended.        B. RECTUM POLYP:      Pertinent Review of Systems:    Per HPI rest 12 point ROS negative      Past Medical History:    Past Medical History:   Diagnosis Date    Hemiplegia, unspecified affecting right dominant side (Multi) 07/24/2019    Hemiparesis, right    Hemiplegia, unspecified affecting right dominant side (Multi) 04/16/2019    Hemiparesis, right    Personal history of other diseases of the circulatory system 07/24/2019    History of intracranial aneurysm    Personal history of other diseases of the digestive system 07/22/2020    History of gastroesophageal reflux (GERD)    Personal history of other diseases of the nervous system and sense organs 04/16/2019    History of seizure disorder    Personal history of other mental and behavioral disorders     History of adjustment disorder        Past Surgical History:  Past Surgical History:   Procedure Laterality Date    CT ANGIO NECK  5/2/2017    CT NECK ANGIO W AND WO IV CONTRAST 5/2/2017 [facility] CLINICAL LEGACY    CT ANGIO NECK  5/8/2017    CT NECK ANGIO W AND WO IV CONTRAST 5/8/2017 [facility] CLINICAL LEGACY    CT ANGIO NECK  5/10/2017    CT NECK ANGIO W AND WO IV CONTRAST 5/10/2017 [facility] CLINICAL LEGACY    CT HEAD ANGIO W AND WO IV CONTRAST  6/22/2018    CT HEAD ANGIO W AND WO IV CONTRAST 6/22/2018 [facility] ANCILLARY LEGACY    CT HEAD ANGIO W AND WO IV CONTRAST  5/2/2017    CT HEAD ANGIO W AND WO IV CONTRAST 5/2/2017 [facility] CLINICAL LEGACY    CT HEAD ANGIO W AND WO IV CONTRAST  5/8/2017    CT HEAD ANGIO W AND WO IV CONTRAST 5/8/2017 [facility] CLINICAL LEGACY    CT HEAD ANGIO W AND WO IV CONTRAST   5/10/2017    CT HEAD ANGIO W AND WO IV CONTRAST 5/10/2017 [facility] CLINICAL LEGACY    OTHER SURGICAL HISTORY  10/09/2017    Cranioplasty        Family History:  No family history on file.     Social History:  Social History     Tobacco Use    Smoking status: Former     Types: Cigarettes     Passive exposure: Past    Smokeless tobacco: Never   Substance Use Topics    Alcohol use: Not Currently        Allergies:  Allergies   Allergen Reactions    Egg Unknown         Medications:  Current Outpatient Medications on File Prior to Visit   Medication Sig Dispense Refill    acetaminophen (Tylenol) 325 mg tablet Take by mouth.      levETIRAcetam (Keppra) 750 mg tablet TAKE ONE (1) TABLET BY MOUTH TWICE DAILY 60 tablet 10    metoprolol tartrate (Lopressor) 25 mg tablet TAKE ONE (1) TABLET BY MOUTH TWICE DAILY 60 tablet 10    topiramate (Topamax) 50 mg tablet TAKE 1 TABLET BY MOUTH ONCE DAILY 30 tablet 10     No current facility-administered medications on file prior to visit.            Physical Exam:  There were no vitals taken for this visit.   General appearance: well appearing, alert, in no acute distress  Skin:  no rashes or lesions  Head: normal  Eyes: Anicteric sclera.   ENT: No oral erythema  Lungs: lungs clear to auscultation, no wheezing or rhonchi  Heart: RRR without murmur  Abdomen:  Abdomen soft, epigastric tenderness bowel sounds normal.   Extremities: Extremities normal.   Musculoskeletal: Muscular strength grossly intact  Neuro: CN2-12 grossly intact     Labs:    Current Outpatient Medications:     acetaminophen (Tylenol) 325 mg tablet, Take by mouth., Disp: , Rfl:     levETIRAcetam (Keppra) 750 mg tablet, TAKE ONE (1) TABLET BY MOUTH TWICE DAILY, Disp: 60 tablet, Rfl: 10    metoprolol tartrate (Lopressor) 25 mg tablet, TAKE ONE (1) TABLET BY MOUTH TWICE DAILY, Disp: 60 tablet, Rfl: 10    topiramate (Topamax) 50 mg tablet, TAKE 1 TABLET BY MOUTH ONCE DAILY, Disp: 30 tablet, Rfl: 10    mesalamine (Lialda) 1.2 gram  EC tablet, Take 1 tablet (1.2 g) by mouth 2 times a day. Do not crush, chew, or split., Disp: 60 tablet, Rfl: 11    omeprazole (PriLOSEC) 40 mg DR capsule, Take 1 capsule (40 mg) by mouth once daily. Do not crush or chew., Disp: 30 capsule, Rfl: 11      SIGNATURE: River Arana MD PATIENT NAME: Raymundo Stevens   DATE: February 20, 2025 MRN: [id number]   TIME: 9:07 AM

## 2025-02-20 NOTE — PATIENT INSTRUCTIONS
Start Lialda twice a day  Start Omeprazole 40 mg once daily  Follow with surgery for hemorrhoids

## 2025-02-26 ENCOUNTER — TELEPHONE (OUTPATIENT)
Dept: SURGERY | Facility: CLINIC | Age: 50
End: 2025-02-26
Payer: MEDICARE

## 2025-05-21 NOTE — PROGRESS NOTES
"NAME: Raymundo Stevens  AGE:  50 y.o.     Last seen:  02.20.25     HISTORY  Patient seen with his sister  States he had flu last week and is having 8-10 bowel movements per day, rarely sees blood in the.  Denies abdominal pain.  He was worried his symptoms are because of his medication so he stopped taking his mesalamine and omeprazole.  Denies any fever or chills.  No weight loss.     Calprotectin ordered last visit not done     PERTINENT PRIOR DIAGNOSTIC TESTING  N/A     MEDICATIONS  Medications ordered prior to the current encounter[1]      ALLERGIES  Allergies[2]      PAST MEDICAL HISTORY  Medical History[3]                  PAST SURGICAL HISTORY  Surgical History[4]        GASTROINTESTINAL REVIEW OF SYSTEMS  Per HPI rest 12 point review of system negative    PHYSICAL EXAMINATION  BP (!) 127/92 (BP Location: Right arm, Patient Position: Sitting) Comment: denies sob and chest pain  Pulse 85   Ht 1.676 m (5' 6\")   Wt 99.3 kg (219 lb)   BMI 35.35 kg/m²   General appearance: NAD  Oropharynx: MMM  Eyes: No scleral icterus  Lungs: Clear to auscultation  Heart: Regular rate and rhythm  Abdomen: Soft, nondistended, nontender, bowel sounds positive in all 4 quadrants  Extremities: No edema  Neuro: Grossly normal     Assessment   IMPRESSION  50-year-old male with past medical history of CVA with right hemiplegia from brain aneurysm hypertension here for follow-up for ulcerative colitis    Ulcerative colitis, sigmoid  Was having 4-5 soft bowel movements before the diagnosis  Sigmoid biopsies during recent colonoscopy showed ulcerative colitis, started on Lialda last visit  Patient and his sister not sure when did he stop taking his Lialda  Is hard to gauge the patient to get exact history about bowel movements and blood in stool currently saying 8-10 bowel movements  At this time I will check CBC , hepatic function panel, CRP calprotectin and C. Difficile  If drop in Hb or significant inflammation based on blood and stool " testing will schedule for sigmoidoscopy and start steroids  Worsening of symptoms patient needs to go to ER, patient and sister understands    Acid reflux  Recommended to restart taking his omeprazole which was helping       River Arana MD         [1]   Current Outpatient Medications   Medication Sig Dispense Refill    acetaminophen (Tylenol) 325 mg tablet Take by mouth.      levETIRAcetam (Keppra) 750 mg tablet TAKE ONE (1) TABLET BY MOUTH TWICE DAILY 60 tablet 10    metoprolol tartrate (Lopressor) 25 mg tablet TAKE ONE (1) TABLET BY MOUTH TWICE DAILY 60 tablet 10    topiramate (Topamax) 50 mg tablet TAKE 1 TABLET BY MOUTH ONCE DAILY 30 tablet 10    mesalamine (Lialda) 1.2 gram EC tablet Take 1 tablet (1.2 g) by mouth 2 times a day. Do not crush, chew, or split. (Patient not taking: Reported on 5/22/2025) 60 tablet 11    omeprazole (PriLOSEC) 40 mg DR capsule Take 1 capsule (40 mg) by mouth once daily. Do not crush or chew. (Patient not taking: Reported on 5/22/2025) 30 capsule 11     No current facility-administered medications for this visit.   [2]   Allergies  Allergen Reactions    Egg Unknown   [3]   Past Medical History:  Diagnosis Date    Hemiplegia, unspecified affecting right dominant side (Multi) 07/24/2019    Hemiparesis, right    Hemiplegia, unspecified affecting right dominant side (Multi) 04/16/2019    Hemiparesis, right    Personal history of other diseases of the circulatory system 07/24/2019    History of intracranial aneurysm    Personal history of other diseases of the digestive system 07/22/2020    History of gastroesophageal reflux (GERD)    Personal history of other diseases of the nervous system and sense organs 04/16/2019    History of seizure disorder    Personal history of other mental and behavioral disorders     History of adjustment disorder   [4]   Past Surgical History:  Procedure Laterality Date    CT ANGIO NECK  5/2/2017    CT NECK ANGIO W AND WO IV CONTRAST 5/2/2017 [facility]  CLINICAL LEGACY    CT ANGIO NECK  5/8/2017    CT NECK ANGIO W AND WO IV CONTRAST 5/8/2017 [facility] CLINICAL LEGACY    CT ANGIO NECK  5/10/2017    CT NECK ANGIO W AND WO IV CONTRAST 5/10/2017 [facility] CLINICAL LEGACY    CT HEAD ANGIO W AND WO IV CONTRAST  6/22/2018    CT HEAD ANGIO W AND WO IV CONTRAST 6[place]22/2018 [facility] [place]    CT HEAD ANGIO W AND WO IV CONTRAST  5/2/2017    CT HEAD ANGIO W AND WO IV CONTRAST 5/2/2017 [facility] CLINICAL LEGACY    CT HEAD ANGIO W AND WO IV CONTRAST  5/8/2017    CT HEAD ANGIO W AND WO IV CONTRAST 5/8/2017 [facility] CLINICAL LEGACY    CT HEAD ANGIO W AND WO IV CONTRAST  5/10/2017    CT HEAD ANGIO W AND WO IV CONTRAST 5/10/2017 [facility] CLINICAL LEGACY    OTHER SURGICAL HISTORY  10/09/2017    Cranioplasty

## 2025-05-22 ENCOUNTER — APPOINTMENT (OUTPATIENT)
Dept: GASTROENTEROLOGY | Facility: CLINIC | Age: 50
End: 2025-05-22
Payer: MEDICARE

## 2025-05-22 ENCOUNTER — LAB REQUISITION (OUTPATIENT)
Dept: LAB | Facility: HOSPITAL | Age: 50
End: 2025-05-22

## 2025-05-22 VITALS
HEIGHT: 66 IN | SYSTOLIC BLOOD PRESSURE: 127 MMHG | HEART RATE: 85 BPM | WEIGHT: 219 LBS | DIASTOLIC BLOOD PRESSURE: 92 MMHG | BODY MASS INDEX: 35.2 KG/M2

## 2025-05-22 DIAGNOSIS — K51.90 ULCERATIVE COLITIS, UNSPECIFIED, WITHOUT COMPLICATIONS (MULTI): ICD-10-CM

## 2025-05-22 DIAGNOSIS — K51.90 ULCERATIVE COLITIS WITHOUT COMPLICATIONS, UNSPECIFIED LOCATION (MULTI): Primary | ICD-10-CM

## 2025-05-22 DIAGNOSIS — R19.7 DIARRHEA, UNSPECIFIED TYPE: ICD-10-CM

## 2025-05-22 DIAGNOSIS — R19.7 DIARRHEA, UNSPECIFIED: ICD-10-CM

## 2025-05-22 DIAGNOSIS — K21.9 GASTROESOPHAGEAL REFLUX DISEASE, UNSPECIFIED WHETHER ESOPHAGITIS PRESENT: ICD-10-CM

## 2025-05-22 LAB
ANION GAP SERPL CALC-SCNC: 13 MMOL/L (ref 10–20)
BUN SERPL-MCNC: 11 MG/DL (ref 6–23)
CALCIUM SERPL-MCNC: 9.3 MG/DL (ref 8.6–10.3)
CHLORIDE SERPL-SCNC: 109 MMOL/L (ref 98–107)
CO2 SERPL-SCNC: 24 MMOL/L (ref 21–32)
CREAT SERPL-MCNC: 1.43 MG/DL (ref 0.5–1.3)
CRP SERPL-MCNC: 1.85 MG/DL
EGFRCR SERPLBLD CKD-EPI 2021: 60 ML/MIN/1.73M*2
ERYTHROCYTE [DISTWIDTH] IN BLOOD BY AUTOMATED COUNT: 13.6 % (ref 11.5–14.5)
GLUCOSE SERPL-MCNC: 91 MG/DL (ref 74–99)
HCT VFR BLD AUTO: 49.8 % (ref 41–52)
HGB BLD-MCNC: 16.3 G/DL (ref 13.5–17.5)
MCH RBC QN AUTO: 29.4 PG (ref 26–34)
MCHC RBC AUTO-ENTMCNC: 32.7 G/DL (ref 32–36)
MCV RBC AUTO: 90 FL (ref 80–100)
NRBC BLD-RTO: 0 /100 WBCS (ref 0–0)
PLATELET # BLD AUTO: 292 X10*3/UL (ref 150–450)
POTASSIUM SERPL-SCNC: 4.3 MMOL/L (ref 3.5–5.3)
RBC # BLD AUTO: 5.55 X10*6/UL (ref 4.5–5.9)
SODIUM SERPL-SCNC: 142 MMOL/L (ref 136–145)
WBC # BLD AUTO: 9.1 X10*3/UL (ref 4.4–11.3)

## 2025-05-22 PROCEDURE — 85027 COMPLETE CBC AUTOMATED: CPT

## 2025-05-22 PROCEDURE — 3008F BODY MASS INDEX DOCD: CPT | Performed by: INTERNAL MEDICINE

## 2025-05-22 PROCEDURE — 99214 OFFICE O/P EST MOD 30 MIN: CPT | Performed by: INTERNAL MEDICINE

## 2025-05-22 PROCEDURE — 1036F TOBACCO NON-USER: CPT | Performed by: INTERNAL MEDICINE

## 2025-05-22 PROCEDURE — 3074F SYST BP LT 130 MM HG: CPT | Performed by: INTERNAL MEDICINE

## 2025-05-22 PROCEDURE — 86140 C-REACTIVE PROTEIN: CPT

## 2025-05-22 PROCEDURE — 80048 BASIC METABOLIC PNL TOTAL CA: CPT

## 2025-05-22 PROCEDURE — 3080F DIAST BP >= 90 MM HG: CPT | Performed by: INTERNAL MEDICINE

## 2025-05-27 ENCOUNTER — ANESTHESIA EVENT (OUTPATIENT)
Dept: GASTROENTEROLOGY | Facility: HOSPITAL | Age: 50
End: 2025-05-27
Payer: MEDICARE

## 2025-05-27 ENCOUNTER — TELEPHONE (OUTPATIENT)
Dept: GASTROENTEROLOGY | Facility: CLINIC | Age: 50
End: 2025-05-27

## 2025-05-27 ENCOUNTER — ANESTHESIA (OUTPATIENT)
Dept: GASTROENTEROLOGY | Facility: HOSPITAL | Age: 50
End: 2025-05-27
Payer: MEDICARE

## 2025-05-27 ENCOUNTER — HOSPITAL ENCOUNTER (OUTPATIENT)
Dept: GASTROENTEROLOGY | Facility: HOSPITAL | Age: 50
Discharge: HOME | End: 2025-05-27
Payer: MEDICARE

## 2025-05-27 VITALS
OXYGEN SATURATION: 96 % | WEIGHT: 218.92 LBS | RESPIRATION RATE: 18 BRPM | TEMPERATURE: 97.3 F | HEART RATE: 72 BPM | BODY MASS INDEX: 35.33 KG/M2 | SYSTOLIC BLOOD PRESSURE: 147 MMHG | DIASTOLIC BLOOD PRESSURE: 84 MMHG

## 2025-05-27 DIAGNOSIS — K51.90 ULCERATIVE COLITIS WITHOUT COMPLICATIONS, UNSPECIFIED LOCATION (MULTI): ICD-10-CM

## 2025-05-27 PROCEDURE — 2500000004 HC RX 250 GENERAL PHARMACY W/ HCPCS (ALT 636 FOR OP/ED): Mod: JZ | Performed by: ANESTHESIOLOGIST ASSISTANT

## 2025-05-27 PROCEDURE — 7100000010 HC PHASE TWO TIME - EACH INCREMENTAL 1 MINUTE

## 2025-05-27 PROCEDURE — 45331 SIGMOIDOSCOPY AND BIOPSY: CPT | Performed by: INTERNAL MEDICINE

## 2025-05-27 PROCEDURE — A45331 PR SIGMOIDOSCOPY,BIOPSY: Performed by: ANESTHESIOLOGY

## 2025-05-27 PROCEDURE — 7100000009 HC PHASE TWO TIME - INITIAL BASE CHARGE

## 2025-05-27 PROCEDURE — 3700000001 HC GENERAL ANESTHESIA TIME - INITIAL BASE CHARGE

## 2025-05-27 PROCEDURE — A45331 PR SIGMOIDOSCOPY,BIOPSY: Performed by: ANESTHESIOLOGIST ASSISTANT

## 2025-05-27 PROCEDURE — 3700000002 HC GENERAL ANESTHESIA TIME - EACH INCREMENTAL 1 MINUTE

## 2025-05-27 RX ORDER — SERTRALINE HYDROCHLORIDE 25 MG/1
25 TABLET, FILM COATED ORAL DAILY
COMMUNITY

## 2025-05-27 RX ORDER — MESALAMINE 1.2 G/1
1200 TABLET, DELAYED RELEASE ORAL 2 TIMES DAILY
Qty: 60 TABLET | Refills: 11 | Status: SHIPPED | OUTPATIENT
Start: 2025-05-27 | End: 2026-05-27

## 2025-05-27 RX ORDER — SODIUM CHLORIDE 0.9 % (FLUSH) 0.9 %
SYRINGE (ML) INJECTION AS NEEDED
Status: DISCONTINUED | OUTPATIENT
Start: 2025-05-27 | End: 2025-05-27

## 2025-05-27 RX ORDER — LIDOCAINE HCL/PF 100 MG/5ML
SYRINGE (ML) INTRAVENOUS AS NEEDED
Status: DISCONTINUED | OUTPATIENT
Start: 2025-05-27 | End: 2025-05-27

## 2025-05-27 RX ORDER — PROPOFOL 10 MG/ML
INJECTION, EMULSION INTRAVENOUS AS NEEDED
Status: DISCONTINUED | OUTPATIENT
Start: 2025-05-27 | End: 2025-05-27

## 2025-05-27 RX ADMIN — PROPOFOL 50 MG: 10 INJECTION, EMULSION INTRAVENOUS at 09:23

## 2025-05-27 RX ADMIN — PROPOFOL 50 MG: 10 INJECTION, EMULSION INTRAVENOUS at 09:20

## 2025-05-27 RX ADMIN — LIDOCAINE HYDROCHLORIDE 100 MG: 20 INJECTION, SOLUTION INTRAVENOUS at 09:19

## 2025-05-27 RX ADMIN — PROPOFOL 50 MG: 10 INJECTION, EMULSION INTRAVENOUS at 09:27

## 2025-05-27 RX ADMIN — Medication 10 ML: at 09:33

## 2025-05-27 RX ADMIN — PROPOFOL 50 MG: 10 INJECTION, EMULSION INTRAVENOUS at 09:17

## 2025-05-27 SDOH — HEALTH STABILITY: MENTAL HEALTH: CURRENT SMOKER: 0

## 2025-05-27 ASSESSMENT — PAIN SCALES - GENERAL
PAINLEVEL_OUTOF10: 0 - NO PAIN

## 2025-05-27 ASSESSMENT — PAIN - FUNCTIONAL ASSESSMENT
PAIN_FUNCTIONAL_ASSESSMENT: 0-10
PAIN_FUNCTIONAL_ASSESSMENT: 0-10

## 2025-05-27 ASSESSMENT — COLUMBIA-SUICIDE SEVERITY RATING SCALE - C-SSRS
6. HAVE YOU EVER DONE ANYTHING, STARTED TO DO ANYTHING, OR PREPARED TO DO ANYTHING TO END YOUR LIFE?: NO
2. HAVE YOU ACTUALLY HAD ANY THOUGHTS OF KILLING YOURSELF?: NO
1. IN THE PAST MONTH, HAVE YOU WISHED YOU WERE DEAD OR WISHED YOU COULD GO TO SLEEP AND NOT WAKE UP?: NO

## 2025-05-27 NOTE — H&P
Outpatient Hospital Procedure    Patient Profile-Procedures  Initial Info  Patient Demographics  Name Raymundo Stevens  Date of Birth 1975  MRN [id number]  Address   [address]   [facility] [id number]   [facility] [place] [address]    Primary Phone Number [phone]  Secondary Phone Number    Earle Trimble    Procedures   Sigmoidoscopy     Indication:  Diarrhea, h/o UC    Primary contact name and number   Extended Emergency Contact Information  Primary Emergency Contact: Shanice Stevens  Mobile Phone: [phone]  Relation: Sibling   needed? No  Secondary Emergency Contact: LyubovHetalJeanne  Home Phone: [phone]  Mobile Phone: [phone]  Relation: Relative   needed? No    General Health  Weight   Vitals:    05/27/25 0837   Weight: 99.3 kg (218 lb 14.7 oz)     BMI Body mass index is 35.33 kg/m².    Allergies  RX Allergies[1]    Past Medical History   Medical History[2]    Provider assessment  Diagnosis  Medication Reviewed - yes  Prior to Admission medications    Medication Sig Start Date End Date Taking? Authorizing Provider   acetaminophen (Tylenol) 325 mg tablet Take by mouth.   Yes Historical Provider, MD   levETIRAcetam (Keppra) 750 mg tablet TAKE ONE (1) TABLET BY MOUTH TWICE DAILY 7/19/24  Yes Earle Andres MD   metoprolol tartrate (Lopressor) 25 mg tablet TAKE ONE (1) TABLET BY MOUTH TWICE DAILY 7/19/24  Yes Earle Andres MD   sertraline (Zoloft) 25 mg tablet Take 1 tablet (25 mg) by mouth once daily.   Yes Historical Provider, MD   topiramate (Topamax) 50 mg tablet TAKE 1 TABLET BY MOUTH ONCE DAILY 9/27/24  Yes Earle Andres MD   mesalamine (Lialda) 1.2 gram EC tablet Take 1 tablet (1.2 g) by mouth 2 times a day. Do not crush, chew, or split.  Patient not taking: Reported on 5/22/2025 2/20/25 2/20/26  River Arana MD   omeprazole (PriLOSEC) 40 mg DR capsule Take 1 capsule (40 mg) by mouth once daily. Do not  crush or chew.  Patient not taking: Reported on 5/22/2025 2/20/25 2/20/26  River Arana MD       This is my H&P    Physical Exam  Physical Exam  Constitutional:       Appearance: Normal appearance.   HENT:      Head: Normocephalic.      Mouth/Throat:      Mouth: Mucous membranes are moist.   Eyes:      Extraocular Movements: Extraocular movements intact.      Pupils: Pupils are equal, round, and reactive to light.   Cardiovascular:      Rate and Rhythm: Normal rate and regular rhythm.      Pulses: Normal pulses.      Heart sounds: Normal heart sounds.   Pulmonary:      Effort: Pulmonary effort is normal.      Breath sounds: Normal breath sounds.   Abdominal:      General: Bowel sounds are normal.      Palpations: Abdomen is soft.   Neurological:      General: No focal deficit present.      Mental Status: He is alert.       ASA PS Classification 3  Sedation Plan Moderate  Procedure Plan - pre-procedural (re)assesment completed by physician:  discharge/transfer patient when discharge criteria met    River Arana MD  5/27/2025 9:05 AM           [1]   Allergies  Allergen Reactions    Egg Unknown   [2]   Past Medical History:  Diagnosis Date    Hemiplegia, unspecified affecting right dominant side (Multi) 07/24/2019    Hemiparesis, right    Hemiplegia, unspecified affecting right dominant side (Multi) 04/16/2019    Hemiparesis, right    Personal history of other diseases of the circulatory system 07/24/2019    History of intracranial aneurysm    Personal history of other diseases of the digestive system 07/22/2020    History of gastroesophageal reflux (GERD)    Personal history of other diseases of the nervous system and sense organs 04/16/2019    History of seizure disorder    Personal history of other mental and behavioral disorders     History of adjustment disorder

## 2025-05-27 NOTE — ANESTHESIA PREPROCEDURE EVALUATION
Patient: Raymundo Stevens    Procedure Information       Date/Time: 05/27/25 0930    Scheduled providers: River Arana MD; Roger Andujar MD    Procedure: FLEXIBLE SIGMOIDOSCOPY    Location: [facility]            Relevant Problems   Anesthesia   (-) Difficult intubation   (-) PONV (postoperative nausea and vomiting)      Cardiac   (+) HTN (hypertension), benign   (+) Hyperlipidemia      Neuro   (+) Aphasia with epilepsy (Multi)   (+) Depression   (+) Seizure disorder (Multi)      GI   (+) GERD (gastroesophageal reflux disease)      Endocrine   (+) Class 2 severe obesity with serious comorbidity and body mass index (BMI) of 37.0 to 37.9 in adult, unspecified obesity type       Clinical information reviewed:    Allergies  Meds               NPO Detail:  NPO/Void Status  Date of Last Liquid: 05/27/25  Time of Last Liquid: 0600  Date of Last Solid: 05/25/25  Time of Last Solid: 1800         Physical Exam    Airway  Mallampati: II  TM distance: >3 FB  Neck ROM: full     Cardiovascular - normal exam  Rhythm: regular  Rate: normal     Dental    Pulmonary - normal exam   Abdominal            Anesthesia Plan    History of general anesthesia?: yes  History of complications of general anesthesia?: no    ASA 3     MAC     The patient is not a current smoker.  Education provided regarding risk of obstructive sleep apnea.  intravenous induction   Anesthetic plan and risks discussed with patient.    Plan discussed with CRNA, CAA and attending.

## 2025-05-27 NOTE — ANESTHESIA POSTPROCEDURE EVALUATION
Patient: Raymundo Stevens    Procedure Summary       Date: 05/27/25 Room / Location: [facility]    Anesthesia Start: 0917 Anesthesia Stop: 0937    Procedure: FLEXIBLE SIGMOIDOSCOPY Diagnosis: Ulcerative colitis without complications, unspecified location (Multi)    Scheduled Providers: River Arana MD; Roger Andujar MD Responsible Provider: Roger Andujar MD    Anesthesia Type: MAC ASA Status: 3            Anesthesia Type: MAC    Vitals Value Taken Time   /84 05/27/25 09:46   Temp 36.3 °C (97.3 °F) 05/27/25 09:34   Pulse 70 05/27/25 10:07   Resp 18 05/27/25 10:00   SpO2 97 % 05/27/25 10:07   Vitals shown include unfiled device data.    Anesthesia Post Evaluation    Patient location during evaluation: PACU  Patient participation: complete - patient participated  Level of consciousness: awake and alert  Pain management: satisfactory to patient  Airway patency: patent  Cardiovascular status: acceptable  Respiratory status: acceptable  Hydration status: acceptable  Postoperative Nausea and Vomiting: none        No notable events documented.

## 2025-05-27 NOTE — TELEPHONE ENCOUNTER
Called pt and left VM that I scheduled a visit with Dr. Arana for next week 6/4 @10:00am in [place] for follow up after procedure today, pls if pt returns call confirm appt with him, if he cannot make it pls r/s

## 2025-05-28 ENCOUNTER — APPOINTMENT (OUTPATIENT)
Dept: PRIMARY CARE | Facility: CLINIC | Age: 50
End: 2025-05-28
Payer: MEDICARE

## 2025-05-28 VITALS
DIASTOLIC BLOOD PRESSURE: 87 MMHG | SYSTOLIC BLOOD PRESSURE: 118 MMHG | OXYGEN SATURATION: 93 % | WEIGHT: 213.6 LBS | TEMPERATURE: 97.3 F | HEART RATE: 89 BPM | BODY MASS INDEX: 34.48 KG/M2

## 2025-05-28 DIAGNOSIS — G40.909 SEIZURE DISORDER (MULTI): ICD-10-CM

## 2025-05-28 DIAGNOSIS — E78.5 HYPERLIPIDEMIA, UNSPECIFIED HYPERLIPIDEMIA TYPE: ICD-10-CM

## 2025-05-28 DIAGNOSIS — Z86.79 HISTORY OF INTRACRANIAL ANEURYSM: ICD-10-CM

## 2025-05-28 DIAGNOSIS — F33.1 MODERATE EPISODE OF RECURRENT MAJOR DEPRESSIVE DISORDER: ICD-10-CM

## 2025-05-28 DIAGNOSIS — I69.020 APHASIA DUE TO AND NOT CONCURRENT WITH NONTRAUMATIC SUBARACHNOID HEMORRHAGE: ICD-10-CM

## 2025-05-28 DIAGNOSIS — I10 HTN (HYPERTENSION), BENIGN: Primary | ICD-10-CM

## 2025-05-28 DIAGNOSIS — Z65.9 CONCERNED ABOUT HAVING SOCIAL PROBLEM: ICD-10-CM

## 2025-05-28 DIAGNOSIS — K51.90 ULCERATIVE COLITIS WITHOUT COMPLICATIONS, UNSPECIFIED LOCATION (MULTI): ICD-10-CM

## 2025-05-28 DIAGNOSIS — K52.9 COLITIS: ICD-10-CM

## 2025-05-28 PROBLEM — E66.812 CLASS 2 SEVERE OBESITY WITH SERIOUS COMORBIDITY AND BODY MASS INDEX (BMI) OF 37.0 TO 37.9 IN ADULT, UNSPECIFIED OBESITY TYPE: Status: RESOLVED | Noted: 2024-11-05 | Resolved: 2025-05-28

## 2025-05-28 PROBLEM — E66.01 CLASS 2 SEVERE OBESITY WITH SERIOUS COMORBIDITY AND BODY MASS INDEX (BMI) OF 37.0 TO 37.9 IN ADULT, UNSPECIFIED OBESITY TYPE: Status: RESOLVED | Noted: 2024-11-05 | Resolved: 2025-05-28

## 2025-05-28 PROBLEM — G81.91 HEMIPARESIS, RIGHT (MULTI): Status: RESOLVED | Noted: 2023-08-11 | Resolved: 2025-05-28

## 2025-05-28 ASSESSMENT — PATIENT HEALTH QUESTIONNAIRE - PHQ9
1. LITTLE INTEREST OR PLEASURE IN DOING THINGS: NOT AT ALL
2. FEELING DOWN, DEPRESSED OR HOPELESS: NOT AT ALL
SUM OF ALL RESPONSES TO PHQ9 QUESTIONS 1 AND 2: 0

## 2025-05-28 NOTE — PATIENT INSTRUCTIONS
Plan  Pt and his sister will let me know if they want to see another GI doctor for colitis  Social service consult done to explore other options   Declines med for depression at present   Continue current meds  F/U in Nov as scheduled for physical

## 2025-05-28 NOTE — TELEPHONE ENCOUNTER
Spoke to pt sister shanice who informed me that due to Raymundo's cognitive brain injury he is displaying some unusual behaviors and is refusing all in person care/visits at the moment. Moving forward all communication should be directly with Shanice. Provided Shanice with direct ext

## 2025-05-28 NOTE — PROGRESS NOTES
My nurse note reviewed. Patient is here for:  Follow-up (6 mo follow up)       Here with sister .   Getting more frustrated lately as per him. Not taking mesalamine and depression med . Declines to take it. Doesnot like his GI doctor, would not like to see him anymore. Offered to ref to other GI doctor but declines at present .   Patient denies any shortness of breath, PND, orthopnea, chest pain , palpitation, syncope or edema in leg  patient denies any abdominal pain, tenderness, nausea, vomiting, change in bowel habits or blood in stool.  No seizures lately   Lives alone in apartment. Wants to move from there . He is on medicaid as per sister and sister has custody for him.   OBJECTIVE :  /87   Pulse 89   Temp 36.3 °C (97.3 °F)   Wt 96.9 kg (213 lb 9.6 oz)   SpO2 93%   BMI 34.48 kg/m²   Uses his I pad to answer questions   Has some memory issues.   Uses cane for ambulation   CVS: S1 S2 + , no S3. No loud heart murmur appreciated. Lungs clear, No edema    Assessment:  1. HTN (hypertension), benign  controlled        2. 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       3. Seizure disorder (Multi)  On med. controlled        4. History of intracranial aneurysm  S/p repair in 2017      5. Hyperlipidemia, unspecified hyperlipidemia type  Hx of      6. Colitis  Saw GI , meds rxed. Not taking it      7. Moderate episode of recurrent major depressive disorder  Declines med for it. Understands consequences      8. Concerned about having social problem  Ref to       During office visit today patient's chronic diagnosis were reviewed and questions related to it answered to patient's satisfaction. I plan to follow up on patient's chronic medical conditions as appropriate for continuity of care .     Plan  Pt and his sister will let me know if they want to see another GI doctor for colitis  Social service consult done to explore other options   Declines med for  depression at present   Continue current meds  F/U in Nov as scheduled for physical

## 2025-06-04 ENCOUNTER — APPOINTMENT (OUTPATIENT)
Dept: GASTROENTEROLOGY | Facility: CLINIC | Age: 50
End: 2025-06-04
Payer: MEDICARE

## 2025-06-10 LAB
LABORATORY COMMENT REPORT: NORMAL
PATH REPORT.FINAL DX SPEC: NORMAL
PATH REPORT.GROSS SPEC: NORMAL
PATH REPORT.RELEVANT HX SPEC: NORMAL
PATH REPORT.TOTAL CANCER: NORMAL

## 2025-06-11 ENCOUNTER — PATIENT OUTREACH (OUTPATIENT)
Dept: CARE COORDINATION | Facility: CLINIC | Age: 50
End: 2025-06-11
Payer: MEDICARE

## 2025-06-11 NOTE — PROGRESS NOTES
Referral received for complex care management services from provider.  Outreach call to introduce self, available services, and assess needs but unable to reach patient at this time.  Will reattempt outreach.  Contact:   NISH Crawford  [facility]  3  Contact: [phone]

## 2025-06-13 ENCOUNTER — PATIENT OUTREACH (OUTPATIENT)
Dept: CARE COORDINATION | Facility: CLINIC | Age: 50
End: 2025-06-13
Payer: MEDICARE

## 2025-06-13 NOTE — PROGRESS NOTES
Pt. Identified for post discharge services. 2nd outreach call to introduce self, available services, and assess needs.  Unable to LVM, will reattempt outreach.   ROSAS Crawford  [facility] [address]    Contact: [phone]

## 2025-06-16 ENCOUNTER — PATIENT OUTREACH (OUTPATIENT)
Dept: CARE COORDINATION | Facility: CLINIC | Age: 50
End: 2025-06-16
Payer: MEDICARE

## 2025-06-18 DIAGNOSIS — G40.909 SEIZURE DISORDER (MULTI): ICD-10-CM

## 2025-06-18 DIAGNOSIS — I10 HTN (HYPERTENSION), BENIGN: ICD-10-CM

## 2025-06-19 RX ORDER — METOPROLOL TARTRATE 25 MG/1
25 TABLET, FILM COATED ORAL 2 TIMES DAILY
Qty: 60 TABLET | Refills: 11 | Status: SHIPPED | OUTPATIENT
Start: 2025-06-19

## 2025-06-19 RX ORDER — LEVETIRACETAM 750 MG/1
750 TABLET ORAL 2 TIMES DAILY
Qty: 60 TABLET | Refills: 11 | Status: SHIPPED | OUTPATIENT
Start: 2025-06-19

## 2025-08-18 DIAGNOSIS — G40.802: ICD-10-CM

## 2025-08-20 RX ORDER — TOPIRAMATE 50 MG/1
50 TABLET, FILM COATED ORAL
Qty: 30 TABLET | Refills: 11 | Status: SHIPPED | OUTPATIENT
Start: 2025-08-20

## 2025-11-12 ENCOUNTER — APPOINTMENT (OUTPATIENT)
Dept: PRIMARY CARE | Facility: CLINIC | Age: 50
End: 2025-11-12
Payer: MEDICARE